# Patient Record
Sex: MALE | Race: BLACK OR AFRICAN AMERICAN
[De-identification: names, ages, dates, MRNs, and addresses within clinical notes are randomized per-mention and may not be internally consistent; named-entity substitution may affect disease eponyms.]

---

## 2017-01-03 ENCOUNTER — HOSPITAL ENCOUNTER (OUTPATIENT)
Dept: HOSPITAL 45 - C.MRI | Age: 57
Discharge: HOME | End: 2017-01-03
Attending: ORTHOPAEDIC SURGERY
Payer: COMMERCIAL

## 2017-01-03 DIAGNOSIS — M25.561: Primary | ICD-10-CM

## 2017-01-03 DIAGNOSIS — C62.90: ICD-10-CM

## 2017-01-03 LAB
EOSINOPHIL NFR BLD AUTO: 258 K/UL (ref 130–400)
HCT VFR BLD CALC: 40.1 % (ref 42–52)
MCH RBC QN AUTO: 21.5 PG (ref 25–34)
MCHC RBC AUTO-ENTMCNC: 33.4 G/DL (ref 32–36)
MCV RBC AUTO: 64.5 FL (ref 80–100)
PMV BLD AUTO: 10.2 FL (ref 7.4–10.4)
RBC # BLD AUTO: 6.22 M/UL (ref 4.7–6.1)
WBC # BLD AUTO: 5.63 K/UL (ref 4.8–10.8)

## 2017-01-03 NOTE — DIAGNOSTIC IMAGING REPORT
MRI right knee RIGHT LOWER EXT JOINT WITHOUT



CLINICAL HISTORY: RIGHT KNEE PAIN M25.561

Right pain



TECHNIQUE: MRI multi axial acquisition



COMPARISON STUDY:  None



FINDINGS: Signal characteristics the osseous structures are in general

unremarkable. There is no significant bone marrow replacing process. There is a

small joint effusion with a trace amount of debris within the suprapatellar

bursa area. The patellar articulating surface appears to be intact. Patellar

retinaculum are intact.



Anterior and posterior cruciate ligaments are unremarkable. The collateral

ligaments are intact.



Evaluation of menisci shows lateral meniscus to be unremarkable in overall

configuration and signal character. Medial meniscus demonstrates a horizontal

intrameniscal cleavage tear at its mid to posterior horn sites. Appears to be

hairline extension to the apex of the mid medial meniscus. No significant loss

of meniscal substance.



IMPRESSION:  

1. Tear posterior horn medial meniscus with hairline extension to the mid medial

meniscal apex.

2. Small joint effusion with a trace amount of debris within the suprapatellar

bursa.

3. Study is otherwise negative 









Electronically signed by:  René Wang M.D.

1/3/2017 12:23 PM

## 2017-01-06 LAB — AFP-TM SERPL-MCNC: 2.6 NG/ML (ref ?–6.1)

## 2017-01-09 ENCOUNTER — HOSPITAL ENCOUNTER (OUTPATIENT)
Dept: HOSPITAL 45 - C.LAB | Age: 57
Discharge: HOME | End: 2017-01-09
Attending: ORTHOPAEDIC SURGERY
Payer: COMMERCIAL
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
## 2017-01-09 DIAGNOSIS — Z01.812: Primary | ICD-10-CM

## 2017-01-09 DIAGNOSIS — S83.241A: ICD-10-CM

## 2017-01-09 LAB
ANION GAP SERPL CALC-SCNC: 8 MMOL/L (ref 3–11)
BASOPHILS # BLD: 0.01 K/UL (ref 0–0.2)
BASOPHILS NFR BLD: 0.2 %
CHLORIDE SERPL-SCNC: 104 MMOL/L (ref 98–107)
CO2 SERPL-SCNC: 30 MMOL/L (ref 21–32)
COMPLETE: YES
EOSINOPHIL NFR BLD AUTO: 274 K/UL (ref 130–400)
HCT VFR BLD CALC: 39.9 % (ref 42–52)
IG%: 0.2 %
IMM GRANULOCYTES NFR BLD AUTO: 40.7 %
LYMPHOCYTES # BLD: 1.86 K/UL (ref 1.2–3.4)
MCH RBC QN AUTO: 21 PG (ref 25–34)
MCHC RBC AUTO-ENTMCNC: 32.6 G/DL (ref 32–36)
MCV RBC AUTO: 64.5 FL (ref 80–100)
MICROCYTES BLD QL SMEAR: PRESENT
MONOCYTES NFR BLD: 14.7 %
NEUTROPHILS # BLD AUTO: 2.4 %
NEUTROPHILS NFR BLD AUTO: 41.8 %
POTASSIUM SERPL-SCNC: 3.7 MMOL/L (ref 3.5–5.1)
RBC # BLD AUTO: 6.19 M/UL (ref 4.7–6.1)
SODIUM SERPL-SCNC: 142 MMOL/L (ref 136–145)
WBC # BLD AUTO: 4.57 K/UL (ref 4.8–10.8)

## 2017-01-17 ENCOUNTER — HOSPITAL ENCOUNTER (OUTPATIENT)
Dept: HOSPITAL 45 - X.SURG | Age: 57
Discharge: HOME | End: 2017-01-17
Attending: ORTHOPAEDIC SURGERY
Payer: COMMERCIAL

## 2017-01-17 VITALS
WEIGHT: 275.58 LBS | BODY MASS INDEX: 40.82 KG/M2 | HEIGHT: 69.02 IN | HEIGHT: 69.02 IN | BODY MASS INDEX: 40.82 KG/M2 | WEIGHT: 275.58 LBS

## 2017-01-17 VITALS — TEMPERATURE: 97.52 F

## 2017-01-17 VITALS — HEART RATE: 61 BPM | SYSTOLIC BLOOD PRESSURE: 118 MMHG | OXYGEN SATURATION: 98 % | DIASTOLIC BLOOD PRESSURE: 78 MMHG

## 2017-01-17 DIAGNOSIS — Z85.47: ICD-10-CM
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
## 2017-01-17 DIAGNOSIS — E11.9: ICD-10-CM

## 2017-01-17 DIAGNOSIS — I10: ICD-10-CM

## 2017-01-17 DIAGNOSIS — G47.33: ICD-10-CM

## 2017-01-17 DIAGNOSIS — S83.241A: Primary | ICD-10-CM
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
## 2017-01-17 NOTE — ANESTHESIA PROGRESS NT - [facility]
Anesthesia Post Op Note


Date & Time


Jan 17, 2017 at 08:43





Vital Signs


Pain Intensity:  4





 Vital Signs Past 12 Hours








  Date Time  Temp Pulse Resp B/P Pulse Ox O2 Delivery O2 Flow Rate FiO2


 


1/17/17 08:06 36.7 104 16 159/104 96 Mask 6 


 


1/17/17 06:45 37.0 89 20 125/89 96 Room Air  











Notes


Mental Status:  alert / awake / arousable, participated in evaluation


Pt Amnestic to Procedure:  Yes


Nausea / Vomiting:  adequately controlled


Pain:  adequately controlled


Airway Patency, RR, SpO2:  stable & adequate


BP & HR:  stable & adequate


Hydration State:  stable & adequate


Anesthetic Complications:  no major complications apparent


Pt doing well. /66.

## 2017-01-17 NOTE — [facility] POST OPERATIVE BRIEF NOTE
Immediate Operative Summary


Operative Date


Jan 17, 2017.





Pre-Operative Diagnosis





Right knee medial meniscus tear





Post-Operative Diagnosis





same





Procedure(s) Performed





Right Knee Arthroscopy, Partial Medial Meniscectomy





Surgeon


Dr Silva





Assistant Surgeon(s)


ANDREW Abbasi PA-C





Estimated Blood Loss


0





Findings


AS ABOVE





Specimens





0





Anesthesia


LMA





Complication(s)


None





Disposition


Recovery Room / PACU

## 2017-01-17 NOTE — OPERATIVE REPORT
DATE OF OPERATION:  01/17/2017

 

PREOPERATIVE DIAGNOSIS:  Displaced posterior horn medial meniscus tear, right

knee.

 

POSTOPERATIVE DIAGNOSIS:  Large displaced flap tear of the posterior horn of

the medial meniscus.

 

PROCEDURE:  Right knee arthroscopy, partial medial meniscectomy.

 

SURGEON:  Dr. Silva.

 

ASSISTANT:  Hollis Abbasi PA-C.

 

ANESTHESIOLOGIST:  Dr. Prince.

 

ANESTHESIA:  LMA.

 

DRAINS:  None.

 

COMPLICATIONS:  None.

 

CONDITION:  The patient tolerated the procedure well and returned to the

recovery room in apparent satisfactory condition.

 

INDICATIONS FOR SURGERY:  David is a 56-year-old male who has had increasing

pain and discomfort after tripping over a manhole cover and injured his knee.

 He has gotten progressively worse.  MRI, physical exam and history are

consistent with a medial meniscus tear and elected to go ahead and proceed

with surgery.  Procedure, expected outcomes and side effects were all

explained in detail.

 

DESCRIPTION OF PROCEDURE:  The patient was taken to the OR at which time he

was placed supine on the operating table and put to sleep by anesthesia

department.  Examination of the knee was performed.  Ligamentous wise it was

stable.  Went ahead and prepped and draped in the usual sterile fashion. 

Began arthroscopic examination in the anteromedial and anterolateral portals.

 In the medial compartment, we found a large flap tear of the posterior horn

of the meniscus.  It was flipped up in the intercondylar notch, it was

reduced back and trimmed out without incident.  It was trimmed back to a

stable rim with upbiting scissors and a full radius resector.  Articular

surface was in good shape.  ACL, lateral compartment good shape.  He had some

synovitis in the patellofemoral joint which was shaved out.  The articular

surfaces also.  The knee then was copiously irrigated.  We removed all

cannulas, closed the portals with 4-0 nylon sutures.  30 mL of ropivacaine,

10 mg of Toradol, 1 mL epinephrine was placed in the knee joint.  Placed a

sterile dressing of Xeroform, 4 x 4, ABD, Sof-Rol, and Ace bandage and

returned to the recovery room in apparent satisfactory condition.

 

SURGICAL FINDINGS:  Posterior horn large flap tear displaced to the medial

meniscus.

 

 

I attest to the content of the Intraoperative Record and any orders documented therein. Any exceptio
ns are noted below.

## 2017-01-17 NOTE — DISCHARGE INSTRUCTIONS-[facility]
Discharge Instructions


Visit


Reason for Visit:  Right Knee Medial Meniscus Tear





Discharge


Discharge Diagnosis / Problem:  SAME AS ABOVE





Discharge Goals


Goal(s):  Decrease discomfort





Activity Recommendations


Activity Limitations:  as noted below


Lifting Limitations:  gradually increase as tolerated


Exercise/Sports Limitations:  until after follow-up appointment


Weightbearing Status:  Right weightbearing (as tolerated)





Anesthesia


.





Post Anesthesia Instructions:





If you have had General Anesthesia or IV Sedation:





*  Do not drive today.


*  Resume driving when surgeon permits.


*  Do not make important decisions or sign legal documents today.


*  Call surgeon for:





   1.  Temperature elevations greater than 101 degrees F.


   2.  Uncontrollable pain.


   3.  Excessive bleeding.


   4.  Persistent nausea and vomiting.


   5.  Medication intolerance (nausea, vomiting or rash).





*  For nausea and vomiting use only clear liquids such as: tea, soda, bouillon 

until nausea subsides, then gradually increase diet as tolerated.





*  If you have any concerns or questions, call your surgeon's office.  If 

physician is unavailable and it is an emergency, call 911 or go to the nearest 

emergency room.





.





Instructions / Follow-Up


Instructions / Follow-Up





MEDICATIONS:





*  Resume previous medications unless instructed otherwise by your surgeon.





*  Always take pain medication on a full stomach or with food to avoid upset 

stomach. 





*  Do not drink alcohol or drive while taking narcotics.





*  Ibuprofen or Tylenol may be taken if narcotic not needed.








SPECIAL CARE INSTRUCTIONS:





__ None





_X_ Keep extremity elevated and iced x 48 hours; apply ice 20-30


    minutes 8-10 times/day. May remove at night.





__ Crutches


    __ May discard when able





__ Brace/Post-op shoe


    __ 24 hrs/day


    __ Remove at night





_X_ Dressing


    __ Maintain until seen in office, may shower with plastic over site


    _X_ Remove dressings in 24-48 hours and then may shower


    _X_ Cover incisions with band-aids after showering


    __ Do not remove steri-strips





Call physician if chills or temperature rises above 102 degrees or


pain unrelieved by prescribed pain medications. 


Office [phone]





Diet Recommendations


Home Diet:  resume previous diet





Procedures


Procedures Performed:  


Right Knee Arthroscopy, Partial Medial Meniscectomy





Pending Studies


Studies pending at discharge:  no





Medical Emergencies








.


Who to Call and When:





Medical Emergencies:  If at any time you feel your situation is an emergency, 

please call 911 immediately.





.





Non-Emergent Contact


Non-Emergency issues call your:  Primary Care Provider





.


.





"Provider Documentation" section prepared by Hollis Abbasi.

## 2017-06-26 ENCOUNTER — HOSPITAL ENCOUNTER (EMERGENCY)
Dept: HOSPITAL 45 - C.EDB | Age: 57
Discharge: HOME | End: 2017-06-26
Payer: COMMERCIAL

## 2017-06-26 VITALS — DIASTOLIC BLOOD PRESSURE: 87 MMHG | SYSTOLIC BLOOD PRESSURE: 171 MMHG | HEART RATE: 68 BPM | OXYGEN SATURATION: 98 %

## 2017-06-26 VITALS
HEIGHT: 69.02 IN | WEIGHT: 276.02 LBS | BODY MASS INDEX: 40.88 KG/M2 | BODY MASS INDEX: 40.88 KG/M2 | HEIGHT: 69.02 IN | WEIGHT: 276.02 LBS

## 2017-06-26 VITALS — TEMPERATURE: 98.06 F

## 2017-06-26 DIAGNOSIS — R10.32: Primary | ICD-10-CM

## 2017-06-26 DIAGNOSIS — E11.9: ICD-10-CM

## 2017-06-26 DIAGNOSIS — M54.5: ICD-10-CM

## 2017-06-26 DIAGNOSIS — Z85.47: ICD-10-CM

## 2017-06-26 DIAGNOSIS — Z83.3: ICD-10-CM

## 2017-06-26 DIAGNOSIS — Z79.82: ICD-10-CM

## 2017-06-26 DIAGNOSIS — Z90.79: ICD-10-CM

## 2017-06-26 DIAGNOSIS — Z79.84: ICD-10-CM

## 2017-06-26 LAB
ALP SERPL-CCNC: 71 U/L (ref 45–117)
ALT SERPL-CCNC: 52 U/L (ref 12–78)
ANION GAP SERPL CALC-SCNC: 10 MMOL/L (ref 3–11)
ANISOCYTOSIS BLD QL SMEAR: PRESENT
APPEARANCE UR: CLEAR
AST SERPL-CCNC: 20 U/L (ref 15–37)
BASOPHILS # BLD: 0.02 K/UL (ref 0–0.2)
BASOPHILS NFR BLD: 0.3 %
BILIRUB UR-MCNC: (no result) MG/DL
BUN SERPL-MCNC: 20 MG/DL (ref 7–18)
BUN/CREAT SERPL: 20 (ref 10–20)
CALCIUM SERPL-MCNC: 9 MG/DL (ref 8.5–10.1)
CHLORIDE SERPL-SCNC: 106 MMOL/L (ref 98–107)
CO2 SERPL-SCNC: 26 MMOL/L (ref 21–32)
COLOR UR: YELLOW
COMPLETE: YES
CREAT CL PREDICTED SERPL C-G-VRATE: 110.5 ML/MIN
CREAT SERPL-MCNC: 1 MG/DL (ref 0.6–1.4)
EOSINOPHIL NFR BLD AUTO: 280 K/UL (ref 130–400)
GLUCOSE SERPL-MCNC: 101 MG/DL (ref 70–99)
HCT VFR BLD CALC: 39.7 % (ref 42–52)
IG%: 0.2 %
IMM GRANULOCYTES NFR BLD AUTO: 43 %
LYMPHOCYTES # BLD: 2.66 K/UL (ref 1.2–3.4)
MANUAL MICROSCOPIC REQUIRED?: NO
MCH RBC QN AUTO: 21.6 PG (ref 25–34)
MCHC RBC AUTO-ENTMCNC: 33.2 G/DL (ref 32–36)
MCV RBC AUTO: 64.9 FL (ref 80–100)
MICROCYTES BLD QL SMEAR: PRESENT
MONOCYTES NFR BLD: 8.1 %
NEUTROPHILS # BLD AUTO: 0.8 %
NEUTROPHILS NFR BLD AUTO: 47.6 %
NITRITE UR QL STRIP: (no result)
OVALOCYTES BLD QL SMEAR: (no result)
PH UR STRIP: 5.5 [PH] (ref 4.5–7.5)
POTASSIUM SERPL-SCNC: 3.7 MMOL/L (ref 3.5–5.1)
RBC # BLD AUTO: 6.12 M/UL (ref 4.7–6.1)
REVIEW REQ?: NO
SODIUM SERPL-SCNC: 142 MMOL/L (ref 136–145)
SP GR UR STRIP: 1.02 (ref 1–1.03)
URINE BILL WITH OR WITHOUT MIC: (no result)
UROBILINOGEN UR-MCNC: (no result) MG/DL
WBC # BLD AUTO: 6.18 K/UL (ref 4.8–10.8)

## 2017-06-26 NOTE — DIAGNOSTIC IMAGING REPORT
CHEST ONE VIEW PORTABLE



CLINICAL HISTORY: Abdominal pain.    



COMPARISON STUDY:  Chest radiograph December 30, 2014.



FINDINGS: Lung volumes are normal. Lungs are clear. There is no evidence of

pulmonary edema. Cardiomediastinal silhouette is normal. There is no

pneumothorax or pleural effusion. 



IMPRESSION:  No acute cardiopulmonary findings. 









Electronically signed by:  Jerardo Thompson M.D.

6/26/2017 6:58 AM



Dictated Date/Time:  6/26/2017 6:58 AM

## 2017-06-26 NOTE — DIAGNOSTIC IMAGING REPORT
CT SCAN OF THE ABDOMEN AND PELVIS WITH IV CONTRAST



CLINICAL HISTORY:   Left lower quadrant abdominal pain.



COMPARISON STUDY:  Abdominal CT dated 9/11/2015.



TECHNIQUE: Following the IV administration of  92 cc of Optiray 320, CT scan of

the abdomen and pelvis is performed from the lung bases to the proximal femora.

Images are reviewed in the axial, sagittal, and coronal planes. IV contrast was

administered without complication. Automated dose control exposure was utilized.



CT DOSE: 1188.78 mGy.cm



FINDINGS:



Lung bases: The heart is mildly enlarged and there is a small pericardial

effusion. The lung bases are clear.



Liver: The contrast-enhanced liver is normal in size, contour, and attenuation.

There is no intrahepatic biliary ductal dilatation. The hepatic veins and portal

veins are patent.



Gallbladder: Unremarkable.



Spleen: Normal in size and attenuation.



Pancreas: Unremarkable.



Adrenal glands: Unremarkable.



Kidneys: The contrast enhanced kidneys are normal in size and without

hydronephrosis. The kidneys enhance symmetrically.



Abdominal vasculature: The abdominal aorta is normal in course and caliber

noting mild atherosclerotic calcification.



Bowel: The small bowel and colon are normal in course and caliber. There are

scattered colonic diverticula without CT evidence of acute diverticulitis.

Colonic fecal retention is observed. The appendix is  well-visualized and

normal.



Peritoneum: There is no intraperitoneal free air or abdominal ascites. There is

a small fat-containing umbilical hernia.



Lymphadenopathy: None.



Pelvic viscera: The bladder, prostate, and seminal vesicles are grossly normal

as imaged. Postoperative change is noted in the right groin. Surgical clips are

noted along the spermatic cord.



Skeletal structures: No lytic or blastic lesions are seen.





IMPRESSION: There are no acute infectious or inflammatory findings in the

abdomen or pelvis.







Electronically signed by:  Quincy Engel M.D.

6/26/2017 9:48 AM



Dictated Date/Time:  6/26/2017 9:43 AM

## 2017-06-26 NOTE — EMERGENCY ROOM VISIT NOTE
History


Report prepared by Marga:  Florencia Guillaume


Under the Supervision of:  Dr. Kyaw Crawford D.O.


First contact with patient:  06:23


Chief Complaint:  BACK PAIN


Stated Complaint:  LWR BACK PAIN,PAIN MOVING STARTED IN CENTER





History of Present Illness


The patient is a 56 year old male who presents to the Emergency Room with 

complaints of worsening constant lower back pain beginning 17 days prior to 

arrival. The patient states that the pain began in the center and moves 

location laterally. He states the pain radiates into his groin. He also notes 

difficulty moving this morning and used a heating pad which helped to alleviate 

his pain. The patient saw his PCP when the pain first began and he is scheduled 

for a CT scan today. He states that his PCP wanted to look at his organs and 

then would treat the back pain. The patient states that he has had similar pain 

to this before and was evaluated for a kidney stone but no stone was found. The 

patient has a history of testicular cancer and did have his right testicle 

removed. The patient is also a diabetic. He notes a recent colonoscopy showed 

polyps. He denies nausea, vomiting, fever, chills, urinary symptoms, dark urine 

or blood in urine, abdominal pain, testicular pain, or a rash.





   Source of History:  patient


   Onset:  17 days PTA


   Position:  back (lower)


   Timing:  constant, worsening


   Associated Symptoms:  No fevers, No chills, No nausea, No vomiting, No 

abdominal pain, No urinary symptoms, No rash





Review of Systems


See HPI for pertinent positives & negatives. A total of 10 systems reviewed and 

were otherwise negative.





Past Medical & Surgical


Medical Problems:


(1) Diabetes


(2) Testicular cancer


Surgical Problems:


(1) H/O vasectomy








Family History





Cancer


Diabetes mellitus


Heart disease





Social History


Smoking Status:  Never Smoker


Alcohol Use:  none


Marital Status:  


Housing Status:  lives with family


Occupation Status:  employed





Current/Historical Medications


Scheduled


Aspirin (Aspirin Chewable), 81 MG PO QAM


Atorvastatin (Lipitor), 40 MG PO DAILY


B-Complex Vitamins (Vitamin B Complex), 1 TAB PO QAM


Calcium (Calcium), 600 MG PO QAM


Cholecalciferol (Vitamin D), 5,000 INTER.UNIT PO QAM


Diclofenac Sod (Diclofenac Sodium Dr), 75 MG PO BID


Metformin Hcl Er (Glucophage Er), 500 MG PO QAM


Multiple Vitamins W/ Minerals (Multi For Him 50+), 1 TAB PO QAM


Niacin (Niacin), 500 MG PO DAILY


Testosterone (Androgel), 1 APPLN TOP QAM


Triamterene/Hctz (Dyazide 37.5MG/25MG), 1 TAB PO DAILY





Scheduled PRN


Oxycodone Immediate Rel Tab (Roxicodone Ir), 1-2 TAB PO Q4H PRN for Severe Pain





Allergies


Coded Allergies:  


     No Known Allergies (Verified , 6/26/17)





Physical Exam


Vital Signs











  Date Time  Temp Pulse Resp B/P (MAP) Pulse Ox O2 Delivery O2 Flow Rate FiO2


 


6/26/17 11:01  68 18 171/87 98   


 


6/26/17 10:39  56      


 


6/26/17 10:17  54 14 161/88 97 Room Air  


 


6/26/17 09:16  54 14 150/90 98 Room Air  


 


6/26/17 08:00  55 15 158/95 96 Room Air  


 


6/26/17 07:07  65      


 


6/26/17 07:06  65 14 146/81 94 Room Air  


 


6/26/17 06:16 36.7 78 20 145/93 97 Room Air  











Physical Exam


GENERAL:  Patient is awake, alert, and in no acute distress. Patient is resting 

comfortably and showing no signs of anxiety


EYES: The conjunctivae are clear.  The pupils are round and reactive. 


EARS, NOSE, MOUTH AND THROAT: The nose is without any evidence of any 

deformity. Mucous membranes are moist tongue is midline 


NECK: The neck is nontender and supple.


RESPIRATORY: Normal respiratory effort is noted there is no evidence of 

wheezing rhonchi or rales


CARDIOVASCULAR:  Regular rate and rhythm noted there no murmurs rubs or gallops 

normal S1 normal S2 


GASTROINTESTINAL: Mildly distended but soft. Left lower quadrant pain to 

palpation. No guarding or rigidity. No inguinal masses. 


PELVIS:  The Pelvis is stable.  No tenderness to palpation is noted.  


BACK:  No midline pain appreciated. Range of motion limited secondary to pain. 

No CVA tenderness to percussion. 


: Circumcised male genitalia, previous orchiectomy noted. No tenderness or 

swelling appreciated in scrotum. 


MUSCULOSKELETAL/EXTREMITIES: There is no evidence of gross deformity full range 

of motion is noted in the hips and shoulders


SKIN: There is no obvious evidence of any rash. There are no petechiae, pallor 

or cyanosis noted. 


NEUROLOGIC:  Patient is awake alert and oriented x3, patellar reflex +1 

bilaterally, Achilles reflex +1 bilaterally, great toe raise symmetrical.





Medical Decision & Procedures


ER Provider


Diagnostic Interpretation:


Radiology results as stated below per my review and radiologist interpretation:





CHEST ONE VIEW PORTABLE





CLINICAL HISTORY: Abdominal pain.    





COMPARISON STUDY:  Chest radiograph December 30, 2014.





FINDINGS: Lung volumes are normal. Lungs are clear. There is no evidence of


pulmonary edema. Cardiomediastinal silhouette is normal. There is no


pneumothorax or pleural effusion. 





IMPRESSION:  No acute cardiopulmonary findings. 





Electronically signed by:  Jerardo Thompson M.D.


6/26/2017 6:58 AM





Dictated Date/Time:  6/26/2017 6:58 AM





CT SCAN OF THE ABDOMEN AND PELVIS WITH IV CONTRAST





CLINICAL HISTORY:   Left lower quadrant abdominal pain.





COMPARISON STUDY:  Abdominal CT dated 9/11/2015.





TECHNIQUE: Following the IV administration of  92 cc of Optiray 320, CT scan of


the abdomen and pelvis is performed from the lung bases to the proximal femora.


Images are reviewed in the axial, sagittal, and coronal planes. IV contrast was


administered without complication. Automated dose control exposure was utilized.





CT DOSE: 1188.78 mGy.cm





FINDINGS:





Lung bases: The heart is mildly enlarged and there is a small pericardial


effusion. The lung bases are clear.





Liver: The contrast-enhanced liver is normal in size, contour, and attenuation.


There is no intrahepatic biliary ductal dilatation. The hepatic veins and portal


veins are patent.





Gallbladder: Unremarkable.





Spleen: Normal in size and attenuation.





Pancreas: Unremarkable.





Adrenal glands: Unremarkable.





Kidneys: The contrast enhanced kidneys are normal in size and without


hydronephrosis. The kidneys enhance symmetrically.





Abdominal vasculature: The abdominal aorta is normal in course and caliber


noting mild atherosclerotic calcification.





Bowel: The small bowel and colon are normal in course and caliber. There are


scattered colonic diverticula without CT evidence of acute diverticulitis.


Colonic fecal retention is observed. The appendix is  well-visualized and


normal.





Peritoneum: There is no intraperitoneal free air or abdominal ascites. There is


a small fat-containing umbilical hernia.





Lymphadenopathy: None.





Pelvic viscera: The bladder, prostate, and seminal vesicles are grossly normal


as imaged. Postoperative change is noted in the right groin. Surgical clips are


noted along the spermatic cord.





Skeletal structures: No lytic or blastic lesions are seen.








IMPRESSION: There are no acute infectious or inflammatory findings in the


abdomen or pelvis.





Electronically signed by:  Quincy Engel M.D.


6/26/2017 9:48 AM





Dictated Date/Time:  6/26/2017 9:43 AM





Laboratory Results


6/26/17 06:32








Red Blood Count 6.12, Mean Corpuscular Volume 64.9, Mean Corpuscular Hemoglobin 

21.6, Mean Corpuscular Hemoglobin Concent 33.2, Neutrophils (%) (Auto) 47.6, 

Lymphocytes (%) (Auto) 43.0, Monocytes (%) (Auto) 8.1, Eosinophils (%) (Auto) 

0.8, Basophils (%) (Auto) 0.3, Neutrophils # (Auto) 2.94, Lymphocytes # (Auto) 

2.66, Monocytes # (Auto) 0.50, Eosinophils # (Auto) 0.05, Basophils # (Auto) 

0.02





6/26/17 06:32

















Test


  6/26/17


06:30 6/26/17


06:32


 


Urine Color YELLOW  


 


Urine Appearance CLEAR (CLEAR)  


 


Urine pH 5.5 (4.5-7.5)  


 


Urine Specific Gravity


  1.022


(1.000-1.030) 


 


 


Urine Protein NEG (NEG)  


 


Urine Glucose (UA) NEG (NEG)  


 


Urine Ketones NEG (NEG)  


 


Urine Occult Blood NEG (NEG)  


 


Urine Nitrite NEG (NEG)  


 


Urine Bilirubin NEG (NEG)  


 


Urine Urobilinogen NEG (NEG)  


 


Urine Leukocyte Esterase NEG (NEG)  


 


White Blood Count


  


  6.18 K/uL


(4.8-10.8)


 


Red Blood Count


  


  6.12 M/uL


(4.7-6.1)


 


Hemoglobin


  


  13.2 g/dL


(14.0-18.0)


 


Hematocrit  39.7 % (42-52) 


 


Mean Corpuscular Volume


  


  64.9 fL


()


 


Mean Corpuscular Hemoglobin


  


  21.6 pg


(25-34)


 


Mean Corpuscular Hemoglobin


Concent 


  33.2 g/dl


(32-36)


 


Platelet Count


  


  280 K/uL


(130-400)


 


Neutrophils (%) (Auto)  47.6 % 


 


Lymphocytes (%) (Auto)  43.0 % 


 


Monocytes (%) (Auto)  8.1 % 


 


Eosinophils (%) (Auto)  0.8 % 


 


Basophils (%) (Auto)  0.3 % 


 


Neutrophils # (Auto)


  


  2.94 K/uL


(1.4-6.5)


 


Lymphocytes # (Auto)


  


  2.66 K/uL


(1.2-3.4)


 


Monocytes # (Auto)


  


  0.50 K/uL


(0.11-0.59)


 


Eosinophils # (Auto)


  


  0.05 K/uL


(0-0.5)


 


Basophils # (Auto)


  


  0.02 K/uL


(0-0.2)


 


RDW Standard Deviation


  


  41.1 fL


(36.4-46.3)


 


RDW Coefficient of Variation


  


  17.6 %


(11.5-14.5)


 


Immature Granulocyte % (Auto)  0.2 % 


 


Immature Granulocyte # (Auto)


  


  0.01 K/uL


(0.00-0.02)


 


Anisocytosis  PRESENT 


 


Microcytosis  PRESENT 


 


Ovalocytes  1+ 


 


Anion Gap


  


  10.0 mmol/L


(3-11)


 


Est Creatinine Clear Calc


Drug Dose 


  110.5 ml/min 


 


 


Estimated GFR (


American) 


  98.5 


 


 


Estimated GFR (Non-


American 


  84.9 


 


 


BUN/Creatinine Ratio  20.0 (10-20) 


 


Calcium Level


  


  9.0 mg/dl


(8.5-10.1)


 


Total Bilirubin


  


  0.5 mg/dl


(0.2-1)


 


Direct Bilirubin


  


  < 0.1 mg/dl


(0-0.2)


 


Aspartate Amino Transf


(AST/SGOT) 


  20 U/L (15-37) 


 


 


Alanine Aminotransferase


(ALT/SGPT) 


  52 U/L (12-78) 


 


 


Alkaline Phosphatase


  


  71 U/L


()


 


Total Protein


  


  7.3 gm/dl


(6.4-8.2)


 


Albumin


  


  3.7 gm/dl


(3.4-5.0)


 


Lipase


  


  152 U/L


()





Laboratory results per my review.





Medications Administered











 Medications


  (Trade)  Dose


 Ordered  Sig/Randal


 Route  Start Time


 Stop Time Status Last Admin


Dose Admin


 


 Sodium Chloride  1,000 ml @ 


 200 mls/hr  Q5H STAT


 IV  6/26/17 06:31


 6/26/17 11:30 DC 6/26/17 06:59


200 MLS/HR


 


 Morphine Sulfate


  (MoRPHine


 SULFATE INJ)  4 mg  Q15M  PRN


 IV  6/26/17 06:45


 6/26/17 12:00 DC 6/26/17 06:59


4 MG


 


 Ondansetron HCl


  (Zofran Inj)  4 mg  NOW  STAT


 IV  6/26/17 06:31


 6/26/17 06:33 DC 6/26/17 06:59


4 MG











ED Course


0625: The patient was evaluated in room A3. A complete history and physical 

examination were performed. 





0631: Zofran Inj 4 mg IV, Sodium Chloride 1,000 ml @ 200 mls/hr IV.





0645: Morphine Sulfate Inj 4 mg IV. 





0724: I reevaluated the patient. 





1033: Upon reevaluation, the patient is hemodynamically stable. I discussed the 

results and treatment plan with him. He verbalized agreement of the treatment 

plan. He was discharged home.





Medical Decision


Differential diagnosis:


Etiologies such as appendicitis, diverticulitis, PUD, biliary pathology, UTI, 

pancreatitis, obstruction, mesenteric ischemia, aortic pathology, infections, 

inflammatory bowel disease, renal colic, as well as others were entertained. 





Medication Reconciliation: I attest that I have personally reviewed the patient'

s current medications list.





Patient was found to have a slightly elevated blood pressure due to 

circumstances. I do not believe that the patient requires hypertension 

monitoring.





The patient is a 56-year-old male who presented to the emergency department for 

an evaluation of left back pain and left lower quadrant abdominal pain. The 

patient is ongoing symptoms for a few weeks. He was seen by his primary care 

physician and was felt to have an intra-abdominal cause for his pain. There was 

also concerned because the patient has a history of testicular cancer in the 

past. He has no scrotal tenderness at this time. I discussed the patient's 

laboratory and radiographic studies with him. He was scheduled for a CT as an 

outpatient and I do feel this would be the more important test to do at this 

time given the patient's history. For this reason a CT was obtained in the 

emergency department. The patient was treated with IV fluids IV pain medicine 

and IV antiemetics. On subsequent reevaluation he was feeling much better. At 

this time I feel his pain is most likely due to musculoskeletal cause. He was 

encouraged to rest and avoid any strenuous activity. He was encouraged to 

continue all medications as prescribed. He is also encouraged to discuss 

possibility he may require further workup for his back pain or possibly an MRI 

the back. He was also encouraged to return to the emergency apartment 

immediately if symptoms change worsen or the need arises.





Impression





 Primary Impression:  


 LLQ abdominal pain


 Additional Impression:  


 Left low back pain





Scribe Attestation


The scribe's documentation has been prepared under my direction and personally 

reviewed by me in its entirety. I confirm that the note above accurately 

reflects all work, treatment, procedures, and medical decision making performed 

by me.





Departure Information


Dispostion


Home / Self-Care





Prescriptions





Oxycodone Immediate Rel Tab (ROXICODONE IR) 5 Mg Tab


1-2 TAB PO Q4H Y for Severe Pain, #24 TAB


   Prov: Kyaw Crawford, DO         6/26/17





Referrals


Son Amanda M.D. (PCP)





Forms


HOME CARE DOCUMENTATION FORM,                                                 

               IMPORTANT VISIT INFORMATION, Work Instructions





Patient Instructions


ED Low Back Pain Injury, [facility]





Additional Instructions





Call your family  to schedule follow-up appointment. Rest and avoid any 

strenuous activity. Continue all medications as prescribed. Discuss the 

possibility with your family  the may require further workup for this back 

pain which may include a referral to a specialist or possibly an MRI.





Problem Qualifiers








 Additional Impression:  


 Left low back pain


 Chronicity:  acute  Sciatica presence:  without sciatica  Qualified Codes:  

M54.5 - Low back pain

## 2017-07-05 ENCOUNTER — HOSPITAL ENCOUNTER (OUTPATIENT)
Dept: HOSPITAL 45 - C.MRI | Age: 57
Discharge: HOME | End: 2017-07-05
Attending: FAMILY MEDICINE
Payer: COMMERCIAL

## 2017-07-05 DIAGNOSIS — M51.26: ICD-10-CM

## 2017-07-05 DIAGNOSIS — M54.16: Primary | ICD-10-CM

## 2017-07-05 DIAGNOSIS — M51.17: ICD-10-CM

## 2017-07-05 NOTE — DIAGNOSTIC IMAGING REPORT
LUMBAR SPINE MRI



HISTORY: Pain. Neuropathy.  PAIN



TECHNIQUE: Multiplanar multisequence MRI of the lumbar spine was performed

without the use of contrast.



COMPARISON:  None.



FINDINGS: For the purpose of the report the L5-S1 disc space will be located on

axial image 23 of 25.

Mild degenerative disc change primarily from L4 through S1. Soft tissue density

posterior to the L3 vertebral body having a maximum cephalocaudal dimension of 7

mm.



L1-L2: No significant central canal or neural foraminal narrowing.



L2-L3: No significant central canal or neural foraminal narrowing.



L3-L4: Left lateral bulging disc with a probable extruded disc fragment

extending superiorly posterior to the left posterior margin of the L3 vertebral

body. This is a maximum cross-sectional dimension of 6 x 9 mm.

Narrowing of the left neuroforamina. Impact upon the thecal thecal sac is

relatively mild

L4-L5: Mild broad-based disc bulge.



L5-S1: No significant central canal or neural foraminal narrowing.



IMPRESSION:  



1. Left lateral bulging disc L3-L4 with an extruded disc fragment extending in a

superior fashion posterior to the L3 vertebral body.

2. This occupies components of the left lateral recess as well as left neural

foramina at L3-L4.

3. Minimal disc bulge L4-L5







Electronically signed by:  René Wang M.D.

7/5/2017 11:07 AM



Dictated Date/Time:  7/5/2017 11:00 AM

## 2018-01-10 ENCOUNTER — HOSPITAL ENCOUNTER (OUTPATIENT)
Dept: HOSPITAL 45 - C.LAB | Age: 58
Discharge: HOME | End: 2018-01-10
Attending: UROLOGY
Payer: COMMERCIAL

## 2018-01-10 DIAGNOSIS — E29.1: Primary | ICD-10-CM

## 2018-01-10 LAB
EOSINOPHIL NFR BLD AUTO: 265 K/UL (ref 130–400)
HCT VFR BLD CALC: 40.1 % (ref 42–52)
HGB BLD-MCNC: 13.1 G/DL (ref 14–18)
MCH RBC QN AUTO: 21.4 PG (ref 25–34)
MCHC RBC AUTO-ENTMCNC: 32.7 G/DL (ref 32–36)
MCV RBC AUTO: 65.5 FL (ref 80–100)
RED CELL DISTRIBUTION WIDTH CV: 17.3 % (ref 11.5–14.5)
RED CELL DISTRIBUTION WIDTH SD: 40.2 FL (ref 36.4–46.3)
WBC # BLD AUTO: 6.16 K/UL (ref 4.8–10.8)